# Patient Record
Sex: FEMALE | Race: WHITE | NOT HISPANIC OR LATINO | Employment: UNEMPLOYED | URBAN - METROPOLITAN AREA
[De-identification: names, ages, dates, MRNs, and addresses within clinical notes are randomized per-mention and may not be internally consistent; named-entity substitution may affect disease eponyms.]

---
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
## 2022-08-10 ENCOUNTER — TELEPHONE (OUTPATIENT)
Dept: OBSTETRICS AND GYNECOLOGY | Facility: CLINIC | Age: 22
End: 2022-08-10
Payer: COMMERCIAL

## 2022-08-10 NOTE — TELEPHONE ENCOUNTER
----- Message from Apple Gale sent at 8/10/2022  1:42 PM CDT -----  Annie Raines wants to switch from Juliana Warren to Vikki Bethea. Please give her a call back to discuss making this change. Patient want to be seen on 08/31.     Phone; [phone] (home)

## 2022-08-31 ENCOUNTER — OFFICE VISIT (OUTPATIENT)
Dept: OBSTETRICS AND GYNECOLOGY | Facility: CLINIC | Age: 22
End: 2022-08-31
Payer: COMMERCIAL

## 2022-08-31 VITALS
HEART RATE: 78 BPM | SYSTOLIC BLOOD PRESSURE: 107 MMHG | BODY MASS INDEX: 25.52 KG/M2 | HEIGHT: 63 IN | DIASTOLIC BLOOD PRESSURE: 69 MMHG | WEIGHT: 144 LBS

## 2022-08-31 DIAGNOSIS — N92.6 IRREGULAR MENSTRUAL CYCLE: ICD-10-CM

## 2022-08-31 DIAGNOSIS — Z30.9 ENCOUNTER FOR CONTRACEPTIVE MANAGEMENT, UNSPECIFIED TYPE: Primary | ICD-10-CM

## 2022-08-31 PROCEDURE — 3078F PR MOST RECENT DIASTOLIC BLOOD PRESSURE < 80 MM HG: ICD-10-PCS | Mod: CPTII,S$GLB,, | Performed by: NURSE PRACTITIONER

## 2022-08-31 PROCEDURE — 99213 PR OFFICE/OUTPT VISIT, EST, LEVL III, 20-29 MIN: ICD-10-PCS | Mod: S$GLB,,, | Performed by: NURSE PRACTITIONER

## 2022-08-31 PROCEDURE — 1159F MED LIST DOCD IN RCRD: CPT | Mod: CPTII,S$GLB,, | Performed by: NURSE PRACTITIONER

## 2022-08-31 PROCEDURE — 3008F PR BODY MASS INDEX (BMI) DOCUMENTED: ICD-10-PCS | Mod: CPTII,S$GLB,, | Performed by: NURSE PRACTITIONER

## 2022-08-31 PROCEDURE — 1160F RVW MEDS BY RX/DR IN RCRD: CPT | Mod: CPTII,S$GLB,, | Performed by: NURSE PRACTITIONER

## 2022-08-31 PROCEDURE — 3074F PR MOST RECENT SYSTOLIC BLOOD PRESSURE < 130 MM HG: ICD-10-PCS | Mod: CPTII,S$GLB,, | Performed by: NURSE PRACTITIONER

## 2022-08-31 PROCEDURE — 3074F SYST BP LT 130 MM HG: CPT | Mod: CPTII,S$GLB,, | Performed by: NURSE PRACTITIONER

## 2022-08-31 PROCEDURE — 3078F DIAST BP <80 MM HG: CPT | Mod: CPTII,S$GLB,, | Performed by: NURSE PRACTITIONER

## 2022-08-31 PROCEDURE — 1159F PR MEDICATION LIST DOCUMENTED IN MEDICAL RECORD: ICD-10-PCS | Mod: CPTII,S$GLB,, | Performed by: NURSE PRACTITIONER

## 2022-08-31 PROCEDURE — 99213 OFFICE O/P EST LOW 20 MIN: CPT | Mod: S$GLB,,, | Performed by: NURSE PRACTITIONER

## 2022-08-31 PROCEDURE — 3008F BODY MASS INDEX DOCD: CPT | Mod: CPTII,S$GLB,, | Performed by: NURSE PRACTITIONER

## 2022-08-31 PROCEDURE — 1160F PR REVIEW ALL MEDS BY PRESCRIBER/CLIN PHARMACIST DOCUMENTED: ICD-10-PCS | Mod: CPTII,S$GLB,, | Performed by: NURSE PRACTITIONER

## 2022-08-31 RX ORDER — BUPROPION HYDROCHLORIDE 150 MG/1
TABLET ORAL
COMMUNITY
Start: 2022-07-29 | End: 2023-04-12

## 2022-08-31 RX ORDER — ARIPIPRAZOLE 2 MG/1
TABLET ORAL
COMMUNITY
Start: 2022-08-15

## 2022-08-31 RX ORDER — SERTRALINE HYDROCHLORIDE 50 MG/1
TABLET, FILM COATED ORAL
COMMUNITY
Start: 2022-07-29 | End: 2023-03-16

## 2022-08-31 RX ORDER — METFORMIN HYDROCHLORIDE 500 MG/1
TABLET, EXTENDED RELEASE ORAL
COMMUNITY
Start: 2022-08-15 | End: 2023-03-16

## 2022-08-31 RX ORDER — NORETHINDRONE ACETATE AND ETHINYL ESTRADIOL AND FERROUS FUMARATE 1MG-20(24)
1 KIT ORAL DAILY
Qty: 28 TABLET | Refills: 11 | Status: SHIPPED | OUTPATIENT
Start: 2022-08-31 | End: 2023-08-31

## 2022-08-31 NOTE — PROGRESS NOTES
"  Subjective:       Patient ID: Annie Raines is a 22 y.o. female.    Chief Complaint:  Contraception and Menstrual Issues (Pt saw Juliana Griffin last year due to passing out around her cycles and wanted to suppress her cycles to help. Reports symptoms have improved but started with a strong odor with her cycles. )      History of Present Illness    Pt reports that she has a HX of syncopal episodes with cycles for which she has been evaluated for by a cardiologist. She reports that this is getting better and not happening as often. She reports a strong onion odor with cycles that can be smelled in the room when on her cycle. Reports that she was put on BC (micronor) but she did not do well on this BC and had breakthrough bleeding. Desirers to have her hormones checked and would like to be put on a combined OCP     Outpatient Medications Marked as Taking for the 22 encounter (Office Visit) with Vikki Bethea NP   Medication Sig Dispense Refill    ARIPiprazole (ABILIFY) 2 MG Tab       buPROPion (WELLBUTRIN XL) 150 MG TB24 tablet       metFORMIN (GLUCOPHAGE-XR) 500 MG ER 24hr tablet       sertraline (ZOLOFT) 50 MG tablet        Vitals:    22 1412   BP: 107/69   Pulse: 78   Weight: 65.3 kg (144 lb)   Height: 5' 3" (1.6 m)     Past Medical History:   Diagnosis Date    Anxiety and depression     Asthma     Insulin resistance      Past Surgical History:   Procedure Laterality Date    EPISIOTOMY      THYROID SURGERY      Removal of nodule       GYN & OB History  Patient's last menstrual period was 2022.   Date of Last Pap: No result found    OB History    Para Term  AB Living   0 0 0 0 0 0   SAB IAB Ectopic Multiple Live Births   0 0 0 0 0       Review of Systems  Review of Systems   Constitutional:  Negative for chills and fever.   Gastrointestinal:  Negative for abdominal pain, constipation, diarrhea, nausea and vomiting.   Genitourinary:  Positive for menstrual problem and vaginal " odor. Negative for dysuria, flank pain, frequency, hematuria, menorrhagia and urgency.   Musculoskeletal:  Negative for back pain.   Integumentary:  Negative for rash.   Neurological:  Negative for headaches.         Objective:    Physical Exam:   Constitutional: She appears well-developed and well-nourished. She is cooperative.       Cardiovascular:  Normal rate, regular rhythm and normal heart sounds.             Pulmonary/Chest: Effort normal and breath sounds normal. No respiratory distress.        Abdominal: Soft and flat. Bowel sounds are normal.                Lymphadenopathy:     She has no cervical adenopathy.    Neurological: She is alert.     Psychiatric: Her speech is normal and behavior is normal. Mood, memory, affect, judgment and thought content normal.        Assessment:        1. Encounter for contraceptive management, unspecified type    2. Irregular menstrual cycle                Plan:      Encounter for contraceptive management, unspecified type  -     norethindrone-e.estradioL-iron (JUNEL FE 24) 1 mg-20 mcg (24)/75 mg (4) per tablet; Take 1 tablet by mouth once daily.  Dispense: 28 tablet; Refill: 11    Irregular menstrual cycle  -     Estradiol; Future; Expected date: 08/31/2022  -     Testosterone; Future; Expected date: 08/31/2022  -     Progesterone; Future; Expected date: 08/31/2022  -     norethindrone-e.estradioL-iron (JUNEL FE 24) 1 mg-20 mcg (24)/75 mg (4) per tablet; Take 1 tablet by mouth once daily.  Dispense: 28 tablet; Refill: 11      We discussed bc options as well as boric acid suppositories or rephresh gel during the times the the odor is present.     Patient education provided on oral contraception, in addition to what to expect during the initiation period, including the following topics:  Breakthrough bleeding or spotting may occur but should spontaneously resolve. Allow up to 3 months for your body to adjust to the regimen, most cases will resolve spontaneously.  For  pregnancy prevention, utilize a backup method of contraception for the first 7 days  Take the pill at the same time every day, set an alarm to serve as a reminder if necessary   If nausea or vomiting occurs when taking the medication, take with food or at bedtime  Missing doses may lead to breakthrough bleeding   Your periods may become lighter and/or shorter in duration than usual  If a pill is missed for one day, take 2 pills the next day  If two consecutive days are missed, take 2 pills for the next 2 days to catch up and finish the birth control pack. Utilize a backup method of contraception for the remainder of the pill pack.  Certain medications, especially antibiotics, may decrease the effectiveness of the pill  Oral contraceptives do not provide protection against sexually transmitted diseases    Patient educated on symptoms indicating the need for emergent care and should be reported to the provider if experienced, including:  Severe abdominal pain  Severe chest pain  Severe headache or paresthesia   Changes in vision  Severe leg pain or swelling  Shortness of breath and increased heart rate     Follow up in about 1 year (around 8/31/2023).

## 2023-03-03 DIAGNOSIS — R55 SYNCOPE AND COLLAPSE: Primary | ICD-10-CM

## 2023-03-03 DIAGNOSIS — R56.9 SEIZURES: ICD-10-CM

## 2023-03-16 ENCOUNTER — OFFICE VISIT (OUTPATIENT)
Dept: NEUROLOGY | Facility: CLINIC | Age: 23
End: 2023-03-16
Payer: COMMERCIAL

## 2023-03-16 VITALS
WEIGHT: 130 LBS | DIASTOLIC BLOOD PRESSURE: 74 MMHG | HEIGHT: 63 IN | SYSTOLIC BLOOD PRESSURE: 110 MMHG | BODY MASS INDEX: 23.04 KG/M2

## 2023-03-16 DIAGNOSIS — R56.9 NONEPILEPTIC EPISODE: Primary | ICD-10-CM

## 2023-03-16 DIAGNOSIS — F32.A ANXIETY AND DEPRESSION: ICD-10-CM

## 2023-03-16 DIAGNOSIS — R56.9 SEIZURES: ICD-10-CM

## 2023-03-16 DIAGNOSIS — F41.9 ANXIETY AND DEPRESSION: ICD-10-CM

## 2023-03-16 DIAGNOSIS — R55 SYNCOPE AND COLLAPSE: ICD-10-CM

## 2023-03-16 PROCEDURE — 1160F RVW MEDS BY RX/DR IN RCRD: CPT | Mod: CPTII,S$GLB,, | Performed by: SPECIALIST

## 2023-03-16 PROCEDURE — 1159F MED LIST DOCD IN RCRD: CPT | Mod: CPTII,S$GLB,, | Performed by: SPECIALIST

## 2023-03-16 PROCEDURE — 1159F PR MEDICATION LIST DOCUMENTED IN MEDICAL RECORD: ICD-10-PCS | Mod: CPTII,S$GLB,, | Performed by: SPECIALIST

## 2023-03-16 PROCEDURE — 3008F PR BODY MASS INDEX (BMI) DOCUMENTED: ICD-10-PCS | Mod: CPTII,S$GLB,, | Performed by: SPECIALIST

## 2023-03-16 PROCEDURE — 99205 OFFICE O/P NEW HI 60 MIN: CPT | Mod: S$GLB,,, | Performed by: SPECIALIST

## 2023-03-16 PROCEDURE — 3008F BODY MASS INDEX DOCD: CPT | Mod: CPTII,S$GLB,, | Performed by: SPECIALIST

## 2023-03-16 PROCEDURE — 3074F PR MOST RECENT SYSTOLIC BLOOD PRESSURE < 130 MM HG: ICD-10-PCS | Mod: CPTII,S$GLB,, | Performed by: SPECIALIST

## 2023-03-16 PROCEDURE — 99999 PR PBB SHADOW E&M-EST. PATIENT-LVL III: CPT | Mod: PBBFAC,,, | Performed by: SPECIALIST

## 2023-03-16 PROCEDURE — 3078F DIAST BP <80 MM HG: CPT | Mod: CPTII,S$GLB,, | Performed by: SPECIALIST

## 2023-03-16 PROCEDURE — 3074F SYST BP LT 130 MM HG: CPT | Mod: CPTII,S$GLB,, | Performed by: SPECIALIST

## 2023-03-16 PROCEDURE — 99999 PR PBB SHADOW E&M-EST. PATIENT-LVL III: ICD-10-PCS | Mod: PBBFAC,,, | Performed by: SPECIALIST

## 2023-03-16 PROCEDURE — 99205 PR OFFICE/OUTPT VISIT, NEW, LEVL V, 60-74 MIN: ICD-10-PCS | Mod: S$GLB,,, | Performed by: SPECIALIST

## 2023-03-16 PROCEDURE — 1160F PR REVIEW ALL MEDS BY PRESCRIBER/CLIN PHARMACIST DOCUMENTED: ICD-10-PCS | Mod: CPTII,S$GLB,, | Performed by: SPECIALIST

## 2023-03-16 PROCEDURE — 3078F PR MOST RECENT DIASTOLIC BLOOD PRESSURE < 80 MM HG: ICD-10-PCS | Mod: CPTII,S$GLB,, | Performed by: SPECIALIST

## 2023-03-16 RX ORDER — SERTRALINE HYDROCHLORIDE 100 MG/1
100 TABLET, FILM COATED ORAL
COMMUNITY
Start: 2023-02-28

## 2023-03-16 RX ORDER — MULTIVITAMIN WITH MINERALS
TABLET ORAL EVERY OTHER DAY
COMMUNITY
Start: 2022-09-28
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
## 2023-03-22 ENCOUNTER — PROCEDURE VISIT (OUTPATIENT)
Dept: SLEEP MEDICINE | Facility: HOSPITAL | Age: 23
End: 2023-03-22
Attending: SPECIALIST
Payer: COMMERCIAL

## 2023-03-22 DIAGNOSIS — R56.9 SEIZURES: ICD-10-CM

## 2023-03-22 DIAGNOSIS — F41.9 ANXIETY AND DEPRESSION: ICD-10-CM

## 2023-03-22 DIAGNOSIS — R56.9 NONEPILEPTIC EPISODE: ICD-10-CM

## 2023-03-22 DIAGNOSIS — R55 SYNCOPE AND COLLAPSE: ICD-10-CM

## 2023-03-22 DIAGNOSIS — F32.A ANXIETY AND DEPRESSION: ICD-10-CM

## 2023-03-22 PROCEDURE — 95719 EEG PHYS/QHP EA INCR W/O VID: CPT | Mod: ,,, | Performed by: SPECIALIST

## 2023-03-22 PROCEDURE — 95708 EEG WO VID EA 12-26HR UNMNTR: CPT

## 2023-03-22 PROCEDURE — 95719 PR EEG, W/O VIDEO, CONT RECORD, I&R, >12<26 HRS: ICD-10-PCS | Mod: ,,, | Performed by: SPECIALIST

## 2023-03-26 NOTE — PROCEDURES
Ambulatory EEG    Date/Time: 3/22/2023 8:00 AM  Performed by: Mina Streeter MD  Authorized by: Mina Streeter MD     Ambulatory / Extended EEG Report    PATIENT: Annie Raines  : 2000    INTERPRETING PHYSICIAN: Mina Streeter     Reason for EEG: seiz/ syncope/ nonepileptic episodes        Digital prolonged ambulatory EEG without video reviewed.  Electrodes placed in customary 10-20 fashion.    Duration of recordin hours   2 minutes.     Patient  awake, drowsy, and asleep.    Sleep episodes are as follows:  Naps: 3 hr nap 930 -1230 roughly   Nocturnal sleep: 1114p-0835    The resting posterior background activity consists of symmetrical background alpha activity.    Lateralizing, focal, or epileptiform features were not present.    Activations (HV photic stimulation) not performed.     Technical character:  good     EKG: nsr    Patient kept a diary.    _._Pertinent entries include:  feeling shaky 1:30 p;  HA 5p  EEG at these times unremarkable.  Patient alert button not pushed.          IMPRESSION: This is a normal awake, drowsy, and asleep extended/ 24 hour ambulatory EEG.     Comments:   The lack of focal or epileptiform features, even on an ambulatory tracing, does not negate or exclude a diagnosis of seizure or epilepsy.    Mina Streeter MD [facility]    Current Outpatient Medications   Medication Instructions    ARIPiprazole (ABILIFY) 2 MG Tab No dose, route, or frequency recorded.    buPROPion (WELLBUTRIN XL) 150 MG  No dose, route, or frequency recorded.    IRON 325 mg (65 mg iron) Tab tablet Oral, Every other day    norethindrone-e.estradioL-iron (JUNEL FE 24)  1 tablet, Oral, Daily    sertraline (ZOLOFT) 100 mg, Oral

## 2023-03-27 ENCOUNTER — TELEPHONE (OUTPATIENT)
Dept: NEUROLOGY | Facility: CLINIC | Age: 23
End: 2023-03-27
Payer: COMMERCIAL

## 2023-03-27 NOTE — TELEPHONE ENCOUNTER
----- Message from Kelsey Callaway sent at 3/24/2023 11:22 AM CDT -----  Regarding: mri results  Patient's mom Melanie called and stated she had her mri done at [facility] in [place] (#[phone]) on 3/10/23. Dr. Streeter wanted a copy of results/disc for her f/u. She stated they will not release to her. Can you request the report. Mom is willing to  disc if they will allow after you reach out to them. She will also need a f/u for mri / 24h eeg results when we have the report. Thank you    Melanie # [phone]

## 2023-03-27 NOTE — TELEPHONE ENCOUNTER
LM for [facility] to rtn my call; s/w mom: aware of awaiting rtn call, and will keep her updated

## 2023-04-12 ENCOUNTER — OFFICE VISIT (OUTPATIENT)
Dept: NEUROLOGY | Facility: CLINIC | Age: 23
End: 2023-04-12
Payer: COMMERCIAL

## 2023-04-12 VITALS
BODY MASS INDEX: 23.04 KG/M2 | DIASTOLIC BLOOD PRESSURE: 72 MMHG | SYSTOLIC BLOOD PRESSURE: 104 MMHG | WEIGHT: 130 LBS | HEIGHT: 63 IN

## 2023-04-12 DIAGNOSIS — R56.9 NONEPILEPTIC EPISODE: Primary | ICD-10-CM

## 2023-04-12 DIAGNOSIS — G90.1 DYSAUTONOMIA: ICD-10-CM

## 2023-04-12 PROCEDURE — 99215 OFFICE O/P EST HI 40 MIN: CPT | Mod: S$GLB,,, | Performed by: SPECIALIST

## 2023-04-12 PROCEDURE — 1159F MED LIST DOCD IN RCRD: CPT | Mod: CPTII,S$GLB,, | Performed by: SPECIALIST

## 2023-04-12 PROCEDURE — 3008F BODY MASS INDEX DOCD: CPT | Mod: CPTII,S$GLB,, | Performed by: SPECIALIST

## 2023-04-12 PROCEDURE — 3008F PR BODY MASS INDEX (BMI) DOCUMENTED: ICD-10-PCS | Mod: CPTII,S$GLB,, | Performed by: SPECIALIST

## 2023-04-12 PROCEDURE — 1159F PR MEDICATION LIST DOCUMENTED IN MEDICAL RECORD: ICD-10-PCS | Mod: CPTII,S$GLB,, | Performed by: SPECIALIST

## 2023-04-12 PROCEDURE — 3078F DIAST BP <80 MM HG: CPT | Mod: CPTII,S$GLB,, | Performed by: SPECIALIST

## 2023-04-12 PROCEDURE — 99215 PR OFFICE/OUTPT VISIT, EST, LEVL V, 40-54 MIN: ICD-10-PCS | Mod: S$GLB,,, | Performed by: SPECIALIST

## 2023-04-12 PROCEDURE — 99999 PR PBB SHADOW E&M-EST. PATIENT-LVL III: CPT | Mod: PBBFAC,,, | Performed by: SPECIALIST

## 2023-04-12 PROCEDURE — 3078F PR MOST RECENT DIASTOLIC BLOOD PRESSURE < 80 MM HG: ICD-10-PCS | Mod: CPTII,S$GLB,, | Performed by: SPECIALIST

## 2023-04-12 PROCEDURE — 99999 PR PBB SHADOW E&M-EST. PATIENT-LVL III: ICD-10-PCS | Mod: PBBFAC,,, | Performed by: SPECIALIST

## 2023-04-12 PROCEDURE — 3074F PR MOST RECENT SYSTOLIC BLOOD PRESSURE < 130 MM HG: ICD-10-PCS | Mod: CPTII,S$GLB,, | Performed by: SPECIALIST

## 2023-04-12 PROCEDURE — 3074F SYST BP LT 130 MM HG: CPT | Mod: CPTII,S$GLB,, | Performed by: SPECIALIST

## 2023-04-12 NOTE — PROGRESS NOTES
"Subjective:         Patient ID: Annie Raines is a 23 y.o. female.    Chief Complaint: test results     HPI:           EEG results (Here for EEG results//Pt reports 6 episodes of fainting since last visit, several episodes on multiple days; ringing in ears prior to episodes; "extreme" nausea, dizziness; review EEG results done 3/22. )    Last sun awoke 02 felt funny and in and out of consciousness for two hours     Mom said cardiologist said "she didn't need to drive and didn't need to work"       notes may also be on facesheet for HPI, ROS, and other sections     Review of Systems          Social History     Socioeconomic History    Marital status: Single   Tobacco Use    Smoking status: Never    Smokeless tobacco: Never   Substance and Sexual Activity    Alcohol use: Never    Drug use: Never    Sexual activity: Never     __Lives alone  Lives with ___  __Drives   __Does not drive   __Working   Retired:     Current Outpatient Medications   Medication Instructions    ARIPiprazole (ABILIFY) 2 MG Tab No dose, route, or frequency recorded.    IRON 325 mg (65 mg iron) Tab tablet Oral, Every other day    norethindrone-e.estradioL-iron (JUNEL FE 24) 1 mg-20 mcg (24)/75 mg (4) per tablet 1 tablet, Oral, Daily    sertraline (ZOLOFT) 100 mg, Oral        Objective:        Mom again showed my video's at my request   5/2021 nonepileptic appearing head arched back facial grimacing grabbing loved one's sweater   Other videos in 2021 pulled up and none appear like epilepsy in my view     Exam  /72 (BP Location: Left arm, Patient Position: Sitting)   Ht 5' 3" (1.6 m)   Wt 59 kg (130 lb)   BMI 23.03 kg/m²     General:   if accompanied, by:_  heart: RRR  pharynx:    Neurological  Speech: quiet   sat there while I talked and mom cried   vis fields:  EOMs: ok   funduscopic:  Motor:   coord:   Gait: ok     Neuroimaging:  Images and imaging reports reviewed.  My comments: b MRI normal     Recent ambulatory EEG normal "     Video's on Mom's phone as above     meds:      __._ multiple issues/ diagnoses or problems [if not enumerated in note then discussed in encounter but not documented]    complexity of data     _._high _mod   _._ images and reports reviewed:  __ hx obtained from family or accompaniment:   __other studies reviewed   __studies ordered __   __studies considered or discussed but not ordered __  __DDx discussed __    Risks    __high ._mod   __ (possible or definite) neurodegenerative condition and inherent progression  __ (poss or def) autoimmune condition with possibility of flares or unexpected attack  __ (poss or def) seiz d.o. with possib of recurr seiz's   __ cerebrovasc ds with risk of recurrence of stroke  __ CNS meds (and/or) potentially high risk non CNS meds which may cause medical or behavioral side effects  __ fall risk  _._ driving discussed   I advise no driving until spell free for at least 6 mos and explained my rationale as to why   __ diagnosis unclear or DDx wide making risk uncertain to high  __other:    MDM/Medical Decision Making     _._high  _moderate           Assessment/Plan:       Problem List Items Addressed This Visit          Neuro    Nonepileptic episode - Primary    Dysautonomia       Other comments/ follow up:        Return if other issues arise     EMU not felt necessary but I did bring up to them     Mina Streeter MD [facility]

## 2023-05-02 ENCOUNTER — TELEPHONE (OUTPATIENT)
Dept: NEUROLOGY | Facility: CLINIC | Age: 23
End: 2023-05-02
Payer: COMMERCIAL

## 2023-05-02 DIAGNOSIS — Z30.017 ENCOUNTER FOR INITIAL PRESCRIPTION OF NEXPLANON: Primary | ICD-10-CM

## 2023-05-02 DIAGNOSIS — R56.9 SEIZURES: Primary | ICD-10-CM

## 2023-05-08 ENCOUNTER — PROCEDURE VISIT (OUTPATIENT)
Dept: OBSTETRICS AND GYNECOLOGY | Facility: CLINIC | Age: 23
End: 2023-05-08
Payer: COMMERCIAL

## 2023-05-08 VITALS
HEIGHT: 63 IN | BODY MASS INDEX: 26.23 KG/M2 | DIASTOLIC BLOOD PRESSURE: 75 MMHG | SYSTOLIC BLOOD PRESSURE: 117 MMHG | WEIGHT: 148.06 LBS | HEART RATE: 92 BPM

## 2023-05-08 DIAGNOSIS — Z30.017 INSERTION OF NEXPLANON: Primary | ICD-10-CM

## 2023-05-08 PROCEDURE — 11981 INSERTION OF NEXPLANON: ICD-10-PCS | Mod: S$GLB,,, | Performed by: OBSTETRICS & GYNECOLOGY

## 2023-05-08 PROCEDURE — 11981 INSERTION DRUG DLVR IMPLANT: CPT | Mod: S$GLB,,, | Performed by: OBSTETRICS & GYNECOLOGY

## 2023-05-08 NOTE — PROCEDURES
Insertion of Nexplanon    Date/Time: 5/8/2023 10:10 AM  Performed by: Ayala Turner MD  Authorized by: Ayala Turner MD     Consent obtained:  Verbal  Consent given by:  Patient  Patient questions answered: yes    Patient agrees, verbalizes understanding, and wants to proceed: yes    Educational handouts given: yes    Instructions and paperwork completed: yes    Local anesthetic:  Lidocaine 1%  Left/right:  Left   68 mg etonogestreL 68 mg  Preloaded Implanon trocar was placed subdermally: yes    Visualization of implant was obtained: yes    Nexplanon was inserted and trocar removed: yes    Visualization of notch in stilette and palpitation of device: yes    Palpitation confirms placement by provider and patient: yes    Site was closed with steri-strips and pressure bandage applied: yes     NEXPLANON INSERTION:      PRE-NEXPLANON INSERTION COUNSELING:  All contraceptive options were reviewed and the patient chooses nexplanon.  Patient's history was reviewed and there were no contraindications to nexplanon.   The procedure and minimal risks of pain, bleeding, bruising and infection at the insertion site discussed. Possible irregular menstrual bleeding pattern versus amenorrhea was explained.  No protection against STDs discussed.  Written information provided; all questions answered and patient agrees to proceed.  Consent signed and scanned into computer.    EXAM:  With patient in supine position the nondominant arm was flexed at the elbow and externally rotated.  The insertion site was identified 6-8 cm above the elbow crease at the inner side of the upper arm overlying the groove between biceps and triceps.  The insertion site was marked and a second didi was placed 6-8 cm above the first.    PROCEDURE:  TIME OUT PERFORMED.  The insertion site was prepped with antiseptic and injected with 1cc of 1% Xylocaine without epinephrine subq along the planned insertion canal.  Xylocaine subq along the planned insertion  canal.  Using sterile technique the Implanon applicator was visually verified and removed from the blister pack.  The base of the applicator was gently tapped with the needle pointed up until the implant disappeared back into the needle and the needle cap was removed.  The needle tip was inserted bevel side up at a 20 degree angle to penetrate the skin.  The applicator was lowered parallel to the arm and the skin was tented with the needle.  The seal of the applicator was broken by pressing the obturator support and turned 90degrees.  The obturator tip was fixed in place on the arm with one hand and with the other hand the needle was slowly and fully retracted back along full length of the obturator.  The grooved tip of the obturator was visible inside the needle and the implant waspalpable after insertion.  A small adhesive bandage and then a pressure bandage was placed over the insertion site.  The patient tolerated the procedure well.    ASSESSMENT:  1. Contraception management / nexplanon insertion.V25.0.    POST NEXPLANON INSERTION COUNSELING:  Manage post nexplanon placement arm pain with NSAIDs, Tylenol or Rx per Med[employer].   Keep arm elevated and apply intermittent ice packs to decrease pain and bruising for 24 Hours.  May remove bandage in 24 hours.  Implanon danger signs (worsening pain at insertion site, bleeding through bandage, redness and/or pus drainage at insertion site).  Removal in 3 years.    Counseling lasted approximately 15 minutes and all her questions were answered.    FOLLOW-UP: with me in two weeks.              [phone]

## 2023-05-19 ENCOUNTER — TELEPHONE (OUTPATIENT)
Dept: NEUROLOGY | Facility: CLINIC | Age: 23
End: 2023-05-19
Payer: COMMERCIAL

## 2023-05-19 NOTE — TELEPHONE ENCOUNTER
Left patient a voice mail about scheduling an appointment with Dr. Gtz from a referral for seizures. Waiting on a call back to schedule them an appointment.

## 2023-10-12 ENCOUNTER — OFFICE VISIT (OUTPATIENT)
Dept: OBSTETRICS AND GYNECOLOGY | Facility: CLINIC | Age: 23
End: 2023-10-12
Payer: COMMERCIAL

## 2023-10-12 VITALS
WEIGHT: 185 LBS | DIASTOLIC BLOOD PRESSURE: 72 MMHG | SYSTOLIC BLOOD PRESSURE: 107 MMHG | HEIGHT: 63 IN | BODY MASS INDEX: 32.78 KG/M2 | HEART RATE: 77 BPM

## 2023-10-12 DIAGNOSIS — R55 SYNCOPE, UNSPECIFIED SYNCOPE TYPE: Primary | ICD-10-CM

## 2023-10-12 DIAGNOSIS — Z01.419 ROUTINE GYNECOLOGICAL EXAMINATION: ICD-10-CM

## 2023-10-12 PROCEDURE — 1159F PR MEDICATION LIST DOCUMENTED IN MEDICAL RECORD: ICD-10-PCS | Mod: CPTII,S$GLB,, | Performed by: OBSTETRICS & GYNECOLOGY

## 2023-10-12 PROCEDURE — 3008F PR BODY MASS INDEX (BMI) DOCUMENTED: ICD-10-PCS | Mod: CPTII,S$GLB,, | Performed by: OBSTETRICS & GYNECOLOGY

## 2023-10-12 PROCEDURE — 99395 PREV VISIT EST AGE 18-39: CPT | Mod: S$GLB,,, | Performed by: OBSTETRICS & GYNECOLOGY

## 2023-10-12 PROCEDURE — 3078F PR MOST RECENT DIASTOLIC BLOOD PRESSURE < 80 MM HG: ICD-10-PCS | Mod: CPTII,S$GLB,, | Performed by: OBSTETRICS & GYNECOLOGY

## 2023-10-12 PROCEDURE — 3074F SYST BP LT 130 MM HG: CPT | Mod: CPTII,S$GLB,, | Performed by: OBSTETRICS & GYNECOLOGY

## 2023-10-12 PROCEDURE — 3074F PR MOST RECENT SYSTOLIC BLOOD PRESSURE < 130 MM HG: ICD-10-PCS | Mod: CPTII,S$GLB,, | Performed by: OBSTETRICS & GYNECOLOGY

## 2023-10-12 PROCEDURE — 1159F MED LIST DOCD IN RCRD: CPT | Mod: CPTII,S$GLB,, | Performed by: OBSTETRICS & GYNECOLOGY

## 2023-10-12 PROCEDURE — 99395 PR PREVENTIVE VISIT,EST,18-39: ICD-10-PCS | Mod: S$GLB,,, | Performed by: OBSTETRICS & GYNECOLOGY

## 2023-10-12 PROCEDURE — 3008F BODY MASS INDEX DOCD: CPT | Mod: CPTII,S$GLB,, | Performed by: OBSTETRICS & GYNECOLOGY

## 2023-10-12 PROCEDURE — 3078F DIAST BP <80 MM HG: CPT | Mod: CPTII,S$GLB,, | Performed by: OBSTETRICS & GYNECOLOGY

## 2023-10-12 RX ORDER — ONDANSETRON 4 MG/1
4 TABLET, FILM COATED ORAL DAILY PRN
Qty: 30 TABLET | Refills: 1 | Status: SHIPPED | OUTPATIENT
Start: 2023-10-12 | End: 2024-01-11 | Stop reason: SDUPTHER

## 2023-10-12 NOTE — PROGRESS NOTES
Subjective:      Patient ID: Annie Raines is a 23 y.o. female.    Chief Complaint:  Well Woman      History of Present Illness  HPI  Pateint has not passed out. Still having periods of nausea. Also with a light period. Has nexplanon. Using ulysses prn.  Syncopal episodes were happening on the day of cycle.   H/o saddle injury.     GYN & OB History  Patient's last menstrual period was 10/09/2023 (exact date).   Date of Last Pap: No result found    OB History    Para Term  AB Living   0 0 0 0 0 0   SAB IAB Ectopic Multiple Live Births   0 0 0 0 0       Review of Systems  Review of Systems   Constitutional:  Negative for activity change, appetite change, chills, diaphoresis, fatigue, fever and unexpected weight change.   Respiratory:  Negative for cough and shortness of breath.    Cardiovascular:  Negative for chest pain, palpitations and leg swelling.   Gastrointestinal:  Negative for abdominal pain, bloating, constipation and diarrhea.   Genitourinary:  Negative for vaginal bleeding, vaginal discharge, vaginal pain, vaginal dryness and vaginal odor.   Musculoskeletal:  Negative for back pain and joint swelling.   Integumentary:  Negative for rash and acne.   Psychiatric/Behavioral:  Negative for depression. The patient is not nervous/anxious.           Objective:     Physical Exam:   Constitutional: She is oriented to person, place, and time. Vital signs are normal. She appears well-developed and well-nourished. She is cooperative.      Neck: No thyroid mass and no thyromegaly present.    Cardiovascular:  Normal rate, regular rhythm and normal pulses.             Pulmonary/Chest: Effort normal. No respiratory distress. Chest wall is not dull to percussion. She exhibits no mass, no bony tenderness, no laceration, no crepitus, no edema, no deformity, no swelling and no retraction. Right breast exhibits no inverted nipple, no mass, no nipple discharge, no skin change, no tenderness, presence, no  bleeding and no swelling. Left breast exhibits no inverted nipple, no mass, no nipple discharge, no skin change, no tenderness, presence, no bleeding and no swelling.        Abdominal: Soft. She exhibits no distension. There is no abdominal tenderness. No hernia.     Genitourinary:    Vagina, uterus and rectum normal.      Pelvic exam was performed with patient supine.   Labial bartholins normal.There is no rash, tenderness, lesion or injury on the right labia. There is no rash, tenderness, lesion or injury on the left labia. Cervix is normal. Right adnexum displays no mass, no tenderness and no fullness. Left adnexum displays no mass, no tenderness and no fullness. No  no vaginal discharge, rectocele, cystocele or unspecified prolapse of vaginal walls in the vagina.           Musculoskeletal: Moves all extremeties.       Neurological: She is alert and oriented to person, place, and time.    Skin: Skin is warm and dry. No rash noted.    Psychiatric: She has a normal mood and affect. Her speech is normal and behavior is normal. Judgment and thought content normal.         Assessment:     Well Woman Exam        Plan:     Routine care  Continue nexplanon as patient has improved.   Syncopal episodes related to hormones.    Discussed diet and exercise  No abnormality on pelvic exam.

## 2023-10-13 LAB — Lab: NORMAL

## 2024-01-09 ENCOUNTER — PATIENT MESSAGE (OUTPATIENT)
Dept: OBSTETRICS AND GYNECOLOGY | Facility: CLINIC | Age: 24
End: 2024-01-09
Payer: COMMERCIAL

## 2024-01-11 ENCOUNTER — TELEPHONE (OUTPATIENT)
Dept: OBSTETRICS AND GYNECOLOGY | Facility: CLINIC | Age: 24
End: 2024-01-11
Payer: COMMERCIAL

## 2024-01-11 ENCOUNTER — OFFICE VISIT (OUTPATIENT)
Dept: OBSTETRICS AND GYNECOLOGY | Facility: CLINIC | Age: 24
End: 2024-01-11
Payer: COMMERCIAL

## 2024-01-11 VITALS
HEART RATE: 118 BPM | WEIGHT: 190 LBS | DIASTOLIC BLOOD PRESSURE: 81 MMHG | BODY MASS INDEX: 33.66 KG/M2 | SYSTOLIC BLOOD PRESSURE: 117 MMHG | HEIGHT: 63 IN

## 2024-01-11 DIAGNOSIS — R53.83 FATIGUE, UNSPECIFIED TYPE: ICD-10-CM

## 2024-01-11 DIAGNOSIS — R11.0 NAUSEA: ICD-10-CM

## 2024-01-11 DIAGNOSIS — N93.9 ABNORMAL UTERINE BLEEDING (AUB): ICD-10-CM

## 2024-01-11 DIAGNOSIS — N94.3: Primary | ICD-10-CM

## 2024-01-11 PROCEDURE — 3074F SYST BP LT 130 MM HG: CPT | Mod: CPTII,S$GLB,, | Performed by: OBSTETRICS & GYNECOLOGY

## 2024-01-11 PROCEDURE — 1159F MED LIST DOCD IN RCRD: CPT | Mod: CPTII,S$GLB,, | Performed by: OBSTETRICS & GYNECOLOGY

## 2024-01-11 PROCEDURE — 3079F DIAST BP 80-89 MM HG: CPT | Mod: CPTII,S$GLB,, | Performed by: OBSTETRICS & GYNECOLOGY

## 2024-01-11 PROCEDURE — 99213 OFFICE O/P EST LOW 20 MIN: CPT | Mod: S$GLB,,, | Performed by: OBSTETRICS & GYNECOLOGY

## 2024-01-11 PROCEDURE — 3008F BODY MASS INDEX DOCD: CPT | Mod: CPTII,S$GLB,, | Performed by: OBSTETRICS & GYNECOLOGY

## 2024-01-11 RX ORDER — ONDANSETRON 4 MG/1
4 TABLET, FILM COATED ORAL EVERY 6 HOURS PRN
Qty: 30 TABLET | Refills: 2 | Status: SHIPPED | OUTPATIENT
Start: 2024-01-11 | End: 2025-01-10

## 2024-01-11 NOTE — TELEPHONE ENCOUNTER
Phone call received on back line from patient mother, attempted to speak to the patient directly. Mother proceeded to discuss patient complaints of passing out since not having an episode of passing for the last six months. Advised mother that patient is not a minor and would have to make her a proxy on her chart to discuss patient medical condition. Pt current phone number obtain and contacted. Pt stated she started her cycle on 12/26/2024 and ended on 1/5/20223, syncope episode noted on yesterday. Same day appointment offered, patient stated she would call her mother and return to clinic if appt time works for her.

## 2024-01-11 NOTE — PROGRESS NOTES
Subjective:      Patient ID: Annie Raines is a 23 y.o. female.    Chief Complaint:  Dysmenorrhea (Patient fainted following her cycle on Monday, has not been feeling well since that time /Had vomiting last night, felt horrible going gee. /Nausea, ringing of the ears. /Has not been tested for covid or Flu/Had the same feelings prior to getting menstrual cycles /Irregular menstrual bleeding /Glucose on Tuesday was 114,after eating /Yesterday 112 / 137 //)      History of Present Illness  HPI  Started period at the end of new years and then ended on Monday. Did not feel the greatest, went to the back and tried to get through it. Tuesday went back and fainted at work.   Hasn't been to work in two days, because was not feeling 100% and also had ringing in ears and went to [employer] and threw up on the way home.   Ears are still ringing and feeling very dizzy, not feeling steady.   These are all symptoms prior to nexplanon.    GYN & OB History  Patient's last menstrual period was 2023 (exact date).   Date of Last Pap: No result found    OB History    Para Term  AB Living   0 0 0 0 0 0   SAB IAB Ectopic Multiple Live Births   0 0 0 0 0       Review of Systems  Review of Systems   Constitutional:  Positive for fatigue. Negative for activity change, appetite change, chills, diaphoresis, fever and unexpected weight change.   HENT:  Positive for tinnitus.    Respiratory:  Negative for cough and shortness of breath.    Cardiovascular:  Negative for chest pain, palpitations and leg swelling.   Gastrointestinal:  Negative for abdominal pain, bloating, constipation and diarrhea.   Genitourinary:  Negative for vaginal bleeding, vaginal discharge, vaginal pain, vaginal dryness and vaginal odor.   Musculoskeletal:  Negative for back pain and joint swelling.   Integumentary:  Negative for rash and acne.   Psychiatric/Behavioral:  Negative for depression. The patient is not nervous/anxious.           Objective:      Physical Exam:   Constitutional: She is oriented to person, place, and time. Vital signs are normal. She appears well-developed and well-nourished. She is cooperative.      Neck: No thyroid mass and no thyromegaly present.    Cardiovascular:  Normal rate and normal pulses.             Pulmonary/Chest: Effort normal. No respiratory distress.        Abdominal: Soft. She exhibits no distension. There is no abdominal tenderness. No hernia.             Musculoskeletal: Moves all extremeties.       Neurological: She is alert and oriented to person, place, and time.    Skin: Skin is warm and dry. No rash noted.    Psychiatric: She has a normal mood and affect. Her speech is normal and behavior is normal. Judgment and thought content normal.         Assessment:       AUB        Plan:     Tried OCP previously and that didn't help as well.   Has had recent weight gain as well.   Patient also with diabetes.   Has gained some weight as well since getting nexplanon.